# Patient Record
(demographics unavailable — no encounter records)

---

## 2025-06-13 NOTE — HEALTH RISK ASSESSMENT
[0] : 2) Feeling down, depressed, or hopeless: Not at all (0) [PHQ-2 Negative - No further assessment needed] : PHQ-2 Negative - No further assessment needed [[id number]] : 0 [Former] : Former [5-9] : 5-9 [de-identified] : smoked for 8 years pack a day

## 2025-06-13 NOTE — HISTORY OF PRESENT ILLNESS
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

## 2025-06-28 NOTE — PHYSICAL EXAM
[No Acute Distress] : no acute distress [Well Nourished] : well nourished [Well Developed] : well developed [Well-Appearing] : well-appearing [Normal Sclera/Conjunctiva] : normal sclera/conjunctiva [PERRL] : pupils equal round and reactive to light [EOMI] : extraocular movements intact [Normal Outer Ear/Nose] : the outer ears and nose were normal in appearance [Normal Oropharynx] : the oropharynx was normal [No JVD] : no jugular venous distention [No Lymphadenopathy] : no lymphadenopathy [Supple] : supple [Thyroid Normal, No Nodules] : the thyroid was normal and there were no nodules present [No Respiratory Distress] : no respiratory distress  [No Accessory Muscle Use] : no accessory muscle use [Clear to Auscultation] : lungs were clear to auscultation bilaterally [Normal Rate] : normal rate  [Regular Rhythm] : with a regular rhythm [Normal S1, S2] : normal S1 and S2 [No Murmur] : no murmur heard [No Carotid Bruits] : no carotid bruits [No Abdominal Bruit] : a ~M bruit was not heard ~T in the abdomen [No Varicosities] : no varicosities [Pedal Pulses Present] : the pedal pulses are present [No Edema] : there was no peripheral edema [No Palpable Aorta] : no palpable aorta [No Extremity Clubbing/Cyanosis] : no extremity clubbing/cyanosis [Soft] : abdomen soft [Non Tender] : non-tender [Non-distended] : non-distended [No Masses] : no abdominal mass palpated [No HSM] : no HSM [Normal Bowel Sounds] : normal bowel sounds [Epididymis] : the epididymides were normal [Testes Tenderness] : no tenderness of the testes [Testes Mass (___cm)] : there were no testicular masses [Normal Posterior Cervical Nodes] : no posterior cervical lymphadenopathy [Normal Anterior Cervical Nodes] : no anterior cervical lymphadenopathy [No CVA Tenderness] : no CVA  tenderness [No Spinal Tenderness] : no spinal tenderness [No Joint Swelling] : no joint swelling [Grossly Normal Strength/Tone] : grossly normal strength/tone [No Rash] : no rash [Coordination Grossly Intact] : coordination grossly intact [No Focal Deficits] : no focal deficits [Normal Gait] : normal gait [Deep Tendon Reflexes (DTR)] : deep tendon reflexes were 2+ and symmetric [Normal Affect] : the affect was normal [Normal Insight/Judgement] : insight and judgment were intact [FreeTextEntry1] : b/l low volume testicles

## 2025-06-28 NOTE — HISTORY OF PRESENT ILLNESS
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  exercises regularly - 3 days a week, lifts weight and do cardio   to male partner, monogmous   Patient on his blood work is iron deficiency anemia, reports he had a colonoscopy 2 years ago which was normal, denies bleeding from GI track, takes oral iron supplementation however has tough time tolerating it, has been experiencing bloating and abdominal discomfort along with constipation which caused him to stop taking oral iron intermittently  Patient with prior hypogonadism diagnosis, currently takes 50 mg of testosterone cypionate twice weekly intramuscularly, testosterone level came back over 1500  Patient with elevated blood pressure on prior visit, had a blood pressure log done which showed blood pressure above 130 over 90s over 10-day period, denies vision problems denies headaches

## 2025-06-28 NOTE — HISTORY OF PRESENT ILLNESS
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  exercises regularly - 3 days a week, lifts weight and do cardio   to male partner, monogmous   Patient on his blood work is iron deficiency anemia, reports he had a colonoscopy 2 years ago which was normal, denies bleeding from GI track, takes oral iron supplementation however has tough time tolerating it, has been experiencing bloating and abdominal discomfort along with constipation which caused him to stop taking oral iron intermittently  Patient with prior hypogonadism diagnosis, currently takes 50 mg of testosterone cypionate twice weekly intramuscularly, testosterone level came back over 1500  Patient with elevated blood pressure on prior visit, had a blood pressure log done which showed blood pressure above 130 over 90s over 10-day period, denies vision problems denies headaches

## 2025-06-28 NOTE — PLAN
[FreeTextEntry1] : 1. Health Maintanance  Testicular Self-Exams recommended  Annual skin cancer screening is recommended Safe Sex Counseling given Exercise and Nutrition counseling given    2. Hypertension patient is counseled on high blood pressure.  low sodium diet is recommended exercise and nutrition counseling is given decision to start pharmacological therapy is made Telmisartan 20 mg is prescribed Patient is advised to continue doing his blood pressure log the target blood pressure should be 120/80 if not at goal we will adjust blood pressure medication  3. Hypogonadism We had an extensive discussion re Risks of T replacement; Patient was informed about Black box warning from FDA. I have advised the patient to stop testosterone replacement therapy for 1 week and to restart therapy with 0.5 ml once weekly then I advised the patient to return to office for through levelto adjust dosage  4. Iron Defficiency Anemia Recommend repeat colonoscopy Patient is unable to tolerate oral iron due to gastrointestinal side effects recommend IV iron infusion, will communicate with infusion center to get it set up